# Patient Record
(demographics unavailable — no encounter records)

---

## 2024-10-31 NOTE — PHYSICAL EXAM
[General Appearance - Alert] : alert [Oriented To Time, Place, And Person] : oriented to person, place, and time [Cranial Nerves Optic (II)] : visual acuity intact bilaterally,  pupils equal round and reactive to light [Cranial Nerves Oculomotor (III)] : extraocular motion intact [Cranial Nerves Trigeminal (V)] : facial sensation intact symmetrically [Cranial Nerves Facial (VII)] : face symmetrical [Cranial Nerves Vestibulocochlear (VIII)] : hearing was intact bilaterally [Cranial Nerves Accessory (XI - Cranial And Spinal)] : head turning and shoulder shrug symmetric [Cranial Nerves Hypoglossal (XII)] : there was no tongue deviation with protrusion [Motor Tone] : muscle tone was normal in all four extremities [Motor Strength] : muscle strength was normal in all four extremities [Involuntary Movements] : no involuntary movements were seen [No Muscle Atrophy] : normal bulk in all four extremities [Balance] : balance was intact [Abnormal Walk] : normal gait [PERRL With Normal Accommodation] : pupils were equal in size, round, reactive to light, with normal accommodation [Extraocular Movements] : extraocular movements were intact

## 2024-10-31 NOTE — PHYSICAL EXAM
[General Appearance - Alert] : alert [Oriented To Time, Place, And Person] : oriented to person, place, and time [Cranial Nerves Optic (II)] : visual acuity intact bilaterally,  pupils equal round and reactive to light [Cranial Nerves Oculomotor (III)] : extraocular motion intact [Cranial Nerves Trigeminal (V)] : facial sensation intact symmetrically [Cranial Nerves Facial (VII)] : face symmetrical [Cranial Nerves Vestibulocochlear (VIII)] : hearing was intact bilaterally [Cranial Nerves Accessory (XI - Cranial And Spinal)] : head turning and shoulder shrug symmetric [Cranial Nerves Hypoglossal (XII)] : there was no tongue deviation with protrusion [Motor Tone] : muscle tone was normal in all four extremities [Motor Strength] : muscle strength was normal in all four extremities [Involuntary Movements] : no involuntary movements were seen [No Muscle Atrophy] : normal bulk in all four extremities [Abnormal Walk] : normal gait [Balance] : balance was intact [PERRL With Normal Accommodation] : pupils were equal in size, round, reactive to light, with normal accommodation [Extraocular Movements] : extraocular movements were intact

## 2024-11-01 NOTE — HISTORY OF PRESENT ILLNESS
[de-identified] : The patient is a 36 yo female. In 2021 she was living in [place] and experiencing tinnitus. An ENT ordered a MRI which relieved a cavernous malformation. In 4/2021 s/p resection of a cavernous malformation at the [facility] by Dr. Cunha. She comes in today with a surveillance MRI and to establish care because she now lives in [facility].  TODAY: has no complaints brings imaging to review

## 2024-11-01 NOTE — ADDENDUM
[FreeTextEntry1] : Patient Name: Teresa Pan  Chief Complaint (CC):   - The patient reported for a maintenance check post-surgery. The previous surgery was for a cavernous malformation.   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 The condition was discovered incidentally when she went for an MRI of her brain due to ear ringing in 2020. The patient had surgery in April 2021 and has been maintaining a successful recovery with zero post-surgery deficits. Post her surgery in 2021, she had a maintenance scan in 2022 which was reviewed by her previous doctor who confirmed everything looked great. The patient moved to [place] since then and is looking to establish care in her current location. She had a recent MRI scan in September, the results of which she has not discussed with a healthcare provider. She reported no current symptoms.   Past Medical History (PMH):   - The patient has previously undergone two rounds of egg freezing and a breast lift. She has a history of cavernous malformation for which she underwent surgery in 2021.   Family History (FH):   - Family medical history was not discussed during the visit.   Social History (SH):   - The patient works in [employer]. She confirmed she is not a smoker.  Medications:   - The patient reported taking birth control.  Allergies:   - No known allergies to any medications.  Review of Systems (ROS):   - No specific review of systems was documented during the encounter.   Physical Examination (PE):   - Physical examination was not documented during the encounter.  Laboratory/Data Review:   - A recent MRI scan was performed which showed no complication or adverse findings. The cavernous malformation operated area appears to be clean.  Assessment:   - The patient is three years post cavernous malformation surgery with no deficits and shows no current symptoms. Review of her recent MRI scan appears unremarkable.  Plan:   - Given the patient's stable condition, there is no immediate need for further scanning at this point. I recommend follow up with a new MRI in two years unless she develops any symptoms before then.  Preventive Health:   - No preventive health measures were discussed during the visit.

## 2024-11-01 NOTE — ADDENDUM
[FreeTextEntry1] : Patient Name: Teresa Pan  Chief Complaint (CC):   - The patient reported for a maintenance check post-surgery. The previous surgery was for a cavernous malformation.   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 The condition was discovered incidentally when she went for an MRI of her brain due to ear ringing in 2020. The patient had surgery in April 2021 and has been maintaining a successful recovery with zero post-surgery deficits. Post her surgery in 2021, she had a maintenance scan in 2022 which was reviewed by her previous doctor who confirmed everything looked great. The patient moved to [place] since then and is looking to establish care in her current location. She had a recent MRI scan in September, the results of which she has not discussed with a healthcare provider. She reported no current symptoms.   Past Medical History (PMH):   - The patient has previously undergone two rounds of egg freezing and a breast lift. She has a history of cavernous malformation for which she underwent surgery in 2021.   Family History (FH):   - Family medical history was not discussed during the visit.   Social History (SH):   - The patient works in [employer]. She confirmed she is not a smoker.  Medications:   - The patient reported taking birth control.  Allergies:   - No known allergies to any medications.  Review of Systems (ROS):   - No specific review of systems was documented during the encounter.   Physical Examination (PE):   - Physical examination was not documented during the encounter.  Laboratory/Data Review:   - A recent MRI scan was performed which showed no complication or adverse findings. The cavernous malformation operated area appears to be clean.  Assessment:   - The patient is three years post cavernous malformation surgery with no deficits and shows no current symptoms. Review of her recent MRI scan appears unremarkable.  Plan:   - Given the patient's stable condition, there is no immediate need for further scanning at this point. I recommend follow up with a new MRI in two years unless she develops any symptoms before then.  Preventive Health:   - No preventive health measures were discussed during the visit.

## 2024-11-01 NOTE — HISTORY OF PRESENT ILLNESS
[de-identified] : The patient is a 34 yo female. In 2021 she was living in [place] and experiencing tinnitus. An ENT ordered a MRI which relieved a cavernous malformation. In 4/2021 s/p resection of a cavernous malformation at the [facility] by Dr. Cunha. She comes in today with a surveillance MRI and to establish care because she now lives in [facility].  TODAY: has no complaints brings imaging to review

## 2024-11-01 NOTE — SURGICAL HISTORY
[de-identified] : Craniotomy for cavernous malformation  [de-identified] : Breast lift and egg freezing

## 2024-11-01 NOTE — REASON FOR VISIT
[New Patient Visit] : a new patient visit [Family Member] : family member [FreeTextEntry1] : s/p cavernous malformation resection in 2021 establish care

## 2024-11-01 NOTE — SURGICAL HISTORY
[de-identified] : Craniotomy for cavernous malformation  [de-identified] : Breast lift and egg freezing

## 2024-11-01 NOTE — ADDENDUM
[FreeTextEntry1] : Patient Name: Teresa Pan  Chief Complaint (CC):   - The patient reported for a maintenance check post-surgery. The previous surgery was for a cavernous malformation.   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 The condition was discovered incidentally when she went for an MRI of her brain due to ear ringing in 2020. The patient had surgery in April 2021 and has been maintaining a successful recovery with zero post-surgery deficits. Post her surgery in 2021, she had a maintenance scan in 2022 which was reviewed by her previous doctor who confirmed everything looked great. The patient moved to [place] since then and is looking to establish care in her current location. She had a recent MRI scan in September, the results of which she has not discussed with a healthcare provider. She reported no current symptoms.   Past Medical History (PMH):   - The patient has previously undergone two rounds of egg freezing and a breast lift. She has a history of cavernous malformation for which she underwent surgery in 2021.   Family History (FH):   - Family medical history was not discussed during the visit.   Social History (SH):   - The patient works in [employer]. She confirmed she is not a smoker.  Medications:   - The patient reported taking birth control.  Allergies:   - No known allergies to any medications.  Review of Systems (ROS):   - No specific review of systems was documented during the encounter.   Physical Examination (PE):   - Physical examination was not documented during the encounter.  Laboratory/Data Review:   - A recent MRI scan was performed which showed no complication or adverse findings. The cavernous malformation operated area appears to be clean.  Assessment:   - The patient is three years post cavernous malformation surgery with no deficits and shows no current symptoms. Review of her recent MRI scan appears unremarkable.  Plan:   - Given the patient's stable condition, there is no immediate need for further scanning at this point. I recommend follow up with a new MRI in two years unless she develops any symptoms before then.  Preventive Health:   - No preventive health measures were discussed during the visit.

## 2024-11-01 NOTE — ASSESSMENT
[FreeTextEntry1] : I, Dr. Aguirre, personally performed the evaluation and management (E/M) services for this new patient who presents today with exacerbation of (an) existing condition(s). That E/M includes conducting the examination, assessing all new/exacerbated conditions, and establishing a new plan of care. Today, my ACP, Jessica Chand, was here to observe my evaluation and management services for this new problem/exacerbated condition to be followed going forward.  PLAN: - Can plan to have a MRI every other year

## 2024-11-01 NOTE — SURGICAL HISTORY
[de-identified] : Craniotomy for cavernous malformation  [de-identified] : Breast lift and egg freezing

## 2024-11-01 NOTE — HISTORY OF PRESENT ILLNESS
[de-identified] : The patient is a 36 yo female. In 2021 she was living in [place] and experiencing tinnitus. An ENT ordered a MRI which relieved a cavernous malformation. In 4/2021 s/p resection of a cavernous malformation at the [facility] by Dr. Cunha. She comes in today with a surveillance MRI and to establish care because she now lives in [facility].  TODAY: has no complaints brings imaging to review